# Patient Record
Sex: FEMALE | URBAN - METROPOLITAN AREA
[De-identification: names, ages, dates, MRNs, and addresses within clinical notes are randomized per-mention and may not be internally consistent; named-entity substitution may affect disease eponyms.]

---

## 2018-01-18 ENCOUNTER — THERAPY VISIT (OUTPATIENT)
Dept: PHYSICAL THERAPY | Facility: CLINIC | Age: 28
End: 2018-01-18
Payer: COMMERCIAL

## 2018-01-18 DIAGNOSIS — M54.9 BACK PAIN IN PREGNANCY: ICD-10-CM

## 2018-01-18 DIAGNOSIS — M54.41 ACUTE RIGHT-SIDED LOW BACK PAIN WITH RIGHT-SIDED SCIATICA: Primary | ICD-10-CM

## 2018-01-18 DIAGNOSIS — O99.891 BACK PAIN IN PREGNANCY: ICD-10-CM

## 2018-01-18 PROCEDURE — 97112 NEUROMUSCULAR REEDUCATION: CPT | Mod: GP | Performed by: PHYSICAL THERAPIST

## 2018-01-18 PROCEDURE — 97110 THERAPEUTIC EXERCISES: CPT | Mod: GP | Performed by: PHYSICAL THERAPIST

## 2018-01-18 PROCEDURE — 97161 PT EVAL LOW COMPLEX 20 MIN: CPT | Mod: GP | Performed by: PHYSICAL THERAPIST

## 2018-01-18 NOTE — LETTER
[facility] ATHLETIC [facility] PHYSICAL THERAPY  [address]  [facility] [address]  [phone]    2018    Re: Ryan George   :   1990  MRN:  [id number]   REFERRING PHYSICIAN:   Frida Goode    [facility] ATHLETIC [facility] PHYSICAL [facility]    Date of Initial Evaluation:  2018  Visits:  Rxs Used: 1  Reason for Referral:     Acute right-sided low back pain with right-sided sciatica  Back pain in pregnancy    EVALUATION SUMMARY    Subjective:  Patient is a 27 year old female presenting with rehab back hpi. The history is provided by the patient.   Ryan George is a 27 year old female with a lumbar condition.  Condition occurred with:  Other reason (patient 26 weeks pregnant. possible onset due to pregnancy 1 month ago).  Condition occurred: other.  This is a new condition  Saw MD on 17.   Patient reports pain:  Lumbar spine right (into buttock).  Radiates to:  Gluteals right.  Pain is described as aching (hurts to walk. dull pain that increases in intensity at times) and is constant and reported as 2/10 (at worst: 9/10. at best: 1/10 (better on weekends)).  Associated symptoms:  Pregnancy and loss of motion/stiffness (increased stiffness from moving less due to pain with motion/activity). Pain is worse in the P.M. (least painful when at work when sitting. aggravated with movement).  Symptoms are exacerbated by bending, walking, lying down, standing, lifting and carrying (worse lying on R side, walking 1.5 hours when does all shopping up to 9/10 pain, ascending stairs especially carrying groceries) and relieved by rest and ice.  Since onset symptoms are gradually improving.  General health as reported by patient is good.  Pertinent medical history includes:  Depression, overweight and currently pregnant.  Medical allergies: yes.  Other surgeries include:  No.  Medication history: prenatals and vitamins.  Current  occupation is .  Patient is working in normal job without restrictions.  Primary job tasks include:  Prolonged sitting, repetitive tasks and other (computer work).  Barriers include:  Other (3rd floor with no elevator).                  Objective:      Lumbar/SI Evaluation  ROM:    AROM Lumbar:   Flexion:          No loss  Ext:                    Min loss increased pain   Side Bend:        Left:     Right:   Rotation:           Left:     Right:   Re: Ryan George   :   1990    Side Glide:        Left:  No loss    Right:  No loss      Lumbar Myotomes:    T12-L3 (Hip Flex):  Left: 5    Right: 4+  L2-4 (Quads):  Left:  5    Right:  5  L4 (Ankle DF):  Left:  5    Right:  5  L5 (Great Toe Ext): Left: 5    Right: 5   Neural Tension/Mobility:  Neural tension wnl lumbar: L SLR creates R sided pain. SLR with DF on L creates R pain (not as intense as SLR w/o DF)    Left side:  SLR and SLR w/DF positive.  Left side:Slump  negative.   Right side:   SLR w/DF; Slump or SLR  negative.   Lumbar Palpation:    Tenderness not present at Left:    ASIS; Iliac Crest or Greater Trochanter  Tenderness present at Right: Piriformis and PSIS  Tenderness not present at Right:  ASIS; Iliac Crest or Greater Trochanter  SI joint/Sacrum:    (-) standing eduar Barnes Lumbar Evaluation  Posture:  Sitting: fair  Standing: fair  Lordosis: WNL  Lateral Shift: no  Correction of Posture: no effect  Test Movements:  EIS: During: increases  After: worse    Repeat EIS: During: increases  After: worse    Principle of Treatment:  Posture Correction: use of lumbar roll, slouch/overcorrect   Other: gluteal stretching, core stabilization, mid range motion, all fours cow position abolishes pain and remains abolished remaining session    Assessment/Plan:    Patient is a 27 year old female with lumbar and sacral complaints.    Patient has the following significant findings with corresponding treatment plan.                 Diagnosis 1:  R sciatic pain in pregnancy    Pain -  hot/cold therapy, manual therapy, self management, education and home program  Decreased ROM/flexibility - manual therapy, therapeutic exercise and home program  Decreased strength - therapeutic exercise, therapeutic activities and home program  Decreased function - therapeutic activities and home program  Impaired posture - neuro re-education and home program    Therapy Evaluation Codes:   1) History comprised of:   Personal factors that impact the plan of care:      .    Comorbidity factors that impact the plan of care are:      Current pregnancy, Overweight and Weakness.     Medications impacting care: Pain.  Re: Mckinleymiguelinavaleriy George   :   1990    2) Examination of Body Systems comprised of:   Body structures and functions that impact the plan of care:      Lumbar spine and Sacral illiac joint.   Activity limitations that impact the plan of care are:      Bending, Dressing, Lifting, Squatting/kneeling, Stairs, Standing and Walking.  3) Clinical presentation characteristics are:   Evolving/Changing.  4) Decision-Making    Low complexity using standardized patient assessment instrument and/or measureable assessment of functional outcome.  Cumulative Therapy Evaluation is: Low complexity.    Previous and current functional limitations:  (See Goal Flow Sheet for this information)    Short term and Long term goals: (See Goal Flow Sheet for this information)     Communication ability:  Patient appears to be able to clearly communicate and understand verbal and written communication and follow directions correctly.  Treatment Explanation - The following has been discussed with the patient:   RX ordered/plan of care  Anticipated outcomes  Possible risks and side effects  This patient would benefit from PT intervention to resume normal activities.   Rehab potential is good.    Frequency:  1 X week, once daily  Duration:  for 6 weeks  Discharge Plan:  Achieve all  LTG.  Independent in home treatment program.  Reach maximal therapeutic benefit.    Thank you for your referral.    INQUIRIES  Therapist: Jessica Gates DPT  [facility] [place] PHYSICAL THERAPY  [address] [address]  Phone: [phone]  Fax: [phone]

## 2018-01-18 NOTE — PROGRESS NOTES
[facility] Initial Evaluation  Subjective:  Patient is a 27 year old female presenting with rehab back hpi. The history is provided by the patient.   Ryan George is a 27 year old female with a lumbar condition.  Condition occurred with:  Other reason (patient 26 weeks pregnant. possible onset due to pregnancy 1 month ago).  Condition occurred: other.  This is a new condition  Saw MD on 12/28/17. .    Patient reports pain:  Lumbar spine right (into buttock).  Radiates to:  Gluteals right.  Pain is described as aching (hurts to walk. dull pain that increases in intensity at times) and is constant and reported as 2/10 (at worst: 9/10. at best: 1/10 (better on weekends)).  Associated symptoms:  Pregnancy and loss of motion/stiffness (increased stiffness from moving less due to pain with motion/activity). Pain is worse in the P.M. (least painful when at work when sitting. aggravated with movement).  Symptoms are exacerbated by bending, walking, lying down, standing, lifting and carrying (worse lying on R side, walking 1.5 hours when does all shopping up to 9/10 pain, ascending stairs especially carrying groceries) and relieved by rest and ice.  Since onset symptoms are gradually improving.        General health as reported by patient is good.  Pertinent medical history includes:  Depression, overweight and currently pregnant.  Medical allergies: yes.  Other surgeries include:  No.  Medication history: prenatals and vitamins.  Current occupation is .  Patient is working in normal job without restrictions.  Primary job tasks include:  Prolonged sitting, repetitive tasks and other (computer work).    Barriers include:  Other (3rd floor with no elevator. ).                            Objective:  System         Lumbar/SI Evaluation  ROM:    AROM Lumbar:   Flexion:          No loss  Ext:                    Min loss increased pain   Side Bend:        Left:     Right:   Rotation:            Left:     Right:   Side Glide:        Left:  No loss    Right:  No loss          Lumbar Myotomes:    T12-L3 (Hip Flex):  Left: 5    Right: 4+  L2-4 (Quads):  Left:  5    Right:  5  L4 (Ankle DF):  Left:  5    Right:  5  L5 (Great Toe Ext): Left: 5    Right: 5           Neural Tension/Mobility:  Neural tension wnl lumbar: L SLR creates R sided pain. SLR with DF on L creates R pain (not as intense as SLR w/o DF)    Left side:  SLR and SLR w/DF positive.  Left side:Slump  negative.     Right side:   SLR w/DF; Slump or SLR  negative.   Lumbar Palpation:      Tenderness not present at Left:    ASIS; Iliac Crest or Greater Trochanter  Tenderness present at Right: Piriformis and PSIS  Tenderness not present at Right:  ASIS; Iliac Crest or Greater Trochanter        SI joint/Sacrum:    (-) standing eduar Barnes Lumbar Evaluation    Posture:  Sitting: fair  Standing: fair  Lordosis: WNL  Lateral Shift: no  Correction of Posture: no effect      Test Movements:    EIS: During: increases  After: worse    Repeat EIS: During: increases  After: worse                Principle of Treatment:  Posture Correction: use of lumbar roll, slouch/overcorrect         Other: gluteal stretching, core stabilization, mid range motion, all fours cow position abolishes pain and remains abolished remaining session                                       ROS    Assessment/Plan:    Patient is a 27 year old female with lumbar and sacral complaints.    Patient has the following significant findings with corresponding treatment plan.                Diagnosis 1:  R sciatic pain in pregnancy    Pain -  hot/cold therapy, manual therapy, self management, education and home program  Decreased ROM/flexibility - manual therapy, therapeutic exercise and home program  Decreased strength - therapeutic exercise, therapeutic activities and home program  Decreased function - therapeutic activities and  home program  Impaired posture - neuro re-education and home program    Therapy Evaluation Codes:   1) History comprised of:   Personal factors that impact the plan of care:      .    Comorbidity factors that impact the plan of care are:      Current pregnancy, Overweight and Weakness.     Medications impacting care: Pain.  2) Examination of Body Systems comprised of:   Body structures and functions that impact the plan of care:      Lumbar spine and Sacral illiac joint.   Activity limitations that impact the plan of care are:      Bending, Dressing, Lifting, Squatting/kneeling, Stairs, Standing and Walking.  3) Clinical presentation characteristics are:   Evolving/Changing.  4) Decision-Making    Low complexity using standardized patient assessment instrument and/or measureable assessment of functional outcome.  Cumulative Therapy Evaluation is: Low complexity.    Previous and current functional limitations:  (See Goal Flow Sheet for this information)    Short term and Long term goals: (See Goal Flow Sheet for this information)     Communication ability:  Patient appears to be able to clearly communicate and understand verbal and written communication and follow directions correctly.  Treatment Explanation - The following has been discussed with the patient:   RX ordered/plan of care  Anticipated outcomes  Possible risks and side effects  This patient would benefit from PT intervention to resume normal activities.   Rehab potential is good.    Frequency:  1 X week, once daily  Duration:  for 6 weeks  Discharge Plan:  Achieve all LTG.  Independent in home treatment program.  Reach maximal therapeutic benefit.    Please refer to the daily flowsheet for treatment today, total treatment time and time spent performing 1:1 timed codes.

## 2018-01-25 ENCOUNTER — THERAPY VISIT (OUTPATIENT)
Dept: PHYSICAL THERAPY | Facility: CLINIC | Age: 28
End: 2018-01-25
Payer: COMMERCIAL

## 2018-01-25 DIAGNOSIS — M54.41 ACUTE RIGHT-SIDED LOW BACK PAIN WITH RIGHT-SIDED SCIATICA: ICD-10-CM

## 2018-01-25 DIAGNOSIS — M54.9 BACK PAIN IN PREGNANCY: ICD-10-CM

## 2018-01-25 DIAGNOSIS — O99.891 BACK PAIN IN PREGNANCY: ICD-10-CM

## 2018-01-25 PROCEDURE — 97110 THERAPEUTIC EXERCISES: CPT | Mod: GP | Performed by: PHYSICAL THERAPIST

## 2018-01-25 PROCEDURE — 97112 NEUROMUSCULAR REEDUCATION: CPT | Mod: GP | Performed by: PHYSICAL THERAPIST

## 2018-02-01 ENCOUNTER — THERAPY VISIT (OUTPATIENT)
Dept: PHYSICAL THERAPY | Facility: CLINIC | Age: 28
End: 2018-02-01
Payer: COMMERCIAL

## 2018-02-01 DIAGNOSIS — O99.891 BACK PAIN IN PREGNANCY: ICD-10-CM

## 2018-02-01 DIAGNOSIS — M54.9 BACK PAIN IN PREGNANCY: ICD-10-CM

## 2018-02-01 DIAGNOSIS — M54.41 ACUTE RIGHT-SIDED LOW BACK PAIN WITH RIGHT-SIDED SCIATICA: ICD-10-CM

## 2018-02-01 PROCEDURE — 97112 NEUROMUSCULAR REEDUCATION: CPT | Mod: GP | Performed by: PHYSICAL THERAPIST

## 2018-02-01 PROCEDURE — 97110 THERAPEUTIC EXERCISES: CPT | Mod: GP | Performed by: PHYSICAL THERAPIST

## 2018-02-01 NOTE — PROGRESS NOTES
[facility] Initial Evaluation  Subjective:  HPI  Oswestry Score: 0 %                 Objective:  System    Physical Exam    General     ROS    Assessment/Plan:    PROGRESS  REPORT    Progress reporting period is from 1/18/18 to 2/1/18, 3 visits.       SUBJECTIVE  Subjective changes noted by patient:  Back has been feeling pretty good.  Pain is only 1-2/10 at worst and exercises help to abolish pain.  Has been able to go to a rally and walk/sit/move for 5 hour time frame without flare up.  Sitting for dressing in the morning really helps.    Current Pain level: 1/10 (not really having pain).     Initial Pain level: 9/10.   Changes in function:  Yes (See Goal flowsheet attached for changes in current functional level)  Adverse reaction to treatment or activity: None    OBJECTIVE  Changes noted in objective findings:  Yes:  LROM flexion min loss, ext no loss, B SG no loss, all LROM painfree.    Lumbar myotomes all 5/5 WNL (R hip flexion improved from 4+/5 to 5/5.    Now (-) B SLR and (-) SLR with DF.    Patient responding well to light core stabilization and gluteal strengthening.     ASSESSMENT/PLAN  Updated problem list and treatment plan: Diagnosis 1:  R sciatic nerve pain    Pain -  home program  Decreased ROM/flexibility - home program  Decreased strength - home program  Decreased function - home program  STG/LTGs have been met or progress has been made towards goals:  Yes (See Goal flow sheet completed today.)  Assessment of Progress: The patient's condition is improving.  Self Management Plans:  Patient is independent in a home treatment program.  Patient is independent in self management of symptoms.  I have re-evaluated this patient and find that the nature, scope, duration and intensity of the therapy is appropriate for the medical condition of the patient.  Ryan continues to require the following intervention to meet STG and LTG's:  PT intervention is no longer required to meet  STG/LTG.    Recommendations:     This patient is ready to be discharged from therapy and continue their home treatment program.  If has changes/flare up in coming weeks due to pregnancy, may utilize more visits, but doing well as of now.    Please refer to the daily flowsheet for treatment today, total treatment time and time spent performing 1:1 timed codes.

## 2018-02-01 NOTE — MR AVS SNAPSHOT
"              After Visit Summary   2018    Ryan George    MRN: [id number]           Patient Information     Date Of Birth          1990        Visit Information        Provider Department      2018 8:10 AM Jessica Gates PT [facility] Physical Therapy        Today's Diagnoses     Acute right-sided low back pain with right-sided sciatica        Back pain in pregnancy           Follow-ups after your visit        Who to contact     If you have questions or need follow up information about today's clinic visit or your schedule please contact [facility] ATHLETIC [facility] PHYSICAL [facility] directly at [phone].  Normal or non-critical lab and imaging results will be communicated to you by Dragonflyhart, letter or phone within 4 business days after the clinic has received the results. If you do not hear from us within 7 days, please contact the clinic through Dragonflyhart or phone. If you have a critical or abnormal lab result, we will notify you by phone as soon as possible.  Submit refill requests through [employer] or call your pharmacy and they will forward the refill request to us. Please allow 3 business days for your refill to be completed.          Additional Information About Your Visit        MyChart Information     [employer] lets you send messages to your doctor, view your test results, renew your prescriptions, schedule appointments and more. To sign up, go to [email or web address] . Click on \"Log in\" on the left side of the screen, which will take you to the Welcome page. Then click on \"Sign up Now\" on the right side of the page.     You will be asked to enter the access code listed below, as well as some personal information. Please follow the directions to create your username and password.     Your access code is: [email or web address]  Expires: 2018  5:02 PM     Your access code will  in 90 days. If you need help or a new code, please call " your [facility] clinic or [phone].        Care EveryWhere ID     This is your Care EveryWhere ID. This could be used by other organizations to access your [facility] medical records  [id number]         Blood Pressure from Last 3 Encounters:   No data found for BP    Weight from Last 3 Encounters:   No data found for Wt              We Performed the Following     TEN PROGRESS NOTES REPORT     NEUROMUSCULAR RE-EDUCATION     THERAPEUTIC EXERCISES        Primary Care Provider Office Phone # Fax #    [facility] [phone] [address]  [place] [facility] [address]        Equal Access to Services     [facility] : Hadii aad ku hadasho Soomaali, waaxda luqadaha, qaybta kaalmada adeegyada, waxay niurkain hayphann basil ricks . So [facility] [phone].    ATENCIÓN: Si habla español, tiene a navarro disposición servicios gratuitos de asistencia lingüística. [facility] [phone].    We comply with applicable federal civil rights laws and [place] laws. We do not discriminate on the basis of race, color, national origin, age, disability, sex, sexual orientation, or gender identity.            Thank you!     Thank you for choosing [facility] PHYSICAL THERAPY  for your care. Our goal is always to provide you with excellent care. Hearing back from our patients is one way we can continue to improve our services. Please take a few minutes to complete the written survey that you may receive in the mail after your visit with us. Thank you!             Your Updated Medication List - Protect others around you: Learn how to safely use, store and throw away your medicines at www.disposemymeds.org.      Notice  As of 2/1/2018  9:08 AM    You have not been prescribed any medications.

## 2018-02-01 NOTE — LETTER
[facility]  [address]  [place] [address]  [phone]    2018    Re: Ryan George   :   1990  MRN:  [id number]   REFERRING PHYSICIAN:   Frida Goode    [facility]    Date of Initial Evaluation:  2018  Visits:  Rxs Used: 3  Reason for Referral:     Acute right-sided low back pain with right-sided sciatica  Back pain in pregnancy    PROGRESS  REPORT  Progress reporting period is from 18 to 18, 3 visits.       SUBJECTIVE  HPI  Oswestry Score: 0 %    Subjective changes noted by patient:  Back has been feeling pretty good.  Pain is only 1-2/10 at worst and exercises help to abolish pain.  Has been able to go to a rally and walk/sit/move for 5 hour time frame without flare up.  Sitting for dressing in the morning really helps.    Current Pain level: 1/10 (not really having pain).     Initial Pain level: 9/10.   Changes in function:  Yes (See Goal flowsheet attached for changes in current functional level)  Adverse reaction to treatment or activity: None    OBJECTIVE  Changes noted in objective findings:  Yes:  LROM flexion min loss, ext no loss, B SG no loss, all LROM painfree.    Lumbar myotomes all 5/5 WNL (R hip flexion improved from 4+/5 to 5/5.    Now (-) B SLR and (-) SLR with DF.    Patient responding well to light core stabilization and gluteal strengthening.     ASSESSMENT/PLAN  Updated problem list and treatment plan: Diagnosis 1:  R sciatic nerve pain  Pain -  home program  Decreased ROM/flexibility - home program  Decreased strength - home program  Decreased function - home program  STG/LTGs have been met or progress has been made towards goals:  Yes (See Goal flow sheet completed today.)  Assessment of Progress: The patient's condition is improving.  Self Management Plans:  Patient is independent in a home treatment program.  Re:  Ryan Ariel   :   1990    Patient is independent in self management of symptoms.  I have re-evaluated this patient and find that the nature, scope, duration and intensity of the therapy is appropriate for the medical condition of the patient.  Ryan continues to require the following intervention to meet STG and LTG's:  PT intervention is no longer required to meet STG/LTG.    Recommendations:     This patient is ready to be discharged from therapy and continue their home treatment program.  If has changes/flare up in coming weeks due to pregnancy, may utilize more visits, but doing well as of now.    Thank you for your referral.    INQUIRIES  Therapist: Jessica Gates DPT  [facility] [place] PHYSICAL THERAPY  [address] [address]  Phone: [phone]  Fax: [phone]

## 2021-03-27 NOTE — MR AVS SNAPSHOT
After Visit Summary   1/18/2018    Ryan George    MRN: [id number]           Patient Information     Date Of Birth          1990        Visit Information        Provider Department      1/18/2018 8:50 AM Jessica Gates, PT [facility] Physical [facility]        Today's Diagnoses     Acute right-sided low back pain with right-sided sciatica    -  1    Back pain in pregnancy           Follow-ups after your visit        Your next 10 appointments already scheduled     Jan 25, 2018 12:10 PM CST   ETN Spine with Jessica Gates PT   [facility] Physical Therapy ([facility])    [address] [place] [address] [address]  [facility] [address]   [phone]            Feb 01, 2018  8:10 AM CST   TEN Spine with Jessica Gates PT   [facility] Physical [facility] ([facility])    [address] [place] [address] [address]  [facility] [address]   [phone]              Who to contact     If you have questions or need follow up information about today's clinic visit or your schedule please contact [facility] PH[facility] directly at [phone].  Normal or non-critical lab and imaging results will be communicated to you by MyChart, letter or phone within 4 business days after the clinic has received the results. If you do not hear from us within 7 days, please contact the clinic through MyChart or phone. If you have a critical or abnormal lab result, we will notify you by phone as soon as possible.  Submit refill requests through [employer] or call your pharmacy and they will forward the refill request to us. Please allow 3 business days for your refill to be completed.          Additional Information About Your Visit        [email or web address] Information     [employer] lets you send messages to your doctor, view your test results, renew your prescriptions, schedule  "appointments and more. To sign up, go to [email or web address] . Click on \"Log in\" on the left side of the screen, which will take you to the Welcome page. Then click on \"Sign up Now\" on the right side of the page.     You will be asked to enter the access code listed below, as well as some personal information. Please follow the directions to create your username and password.     Your access code is: [email or web address]  Expires: 2018  5:02 PM     Your access code will  in 90 days. If you need help or a new code, please call your [facility] clinic or [phone].        Care EveryWhere ID     This is your Care EveryWhere ID. This could be used by other organizations to access your [facility] medical records  [id number]         Blood Pressure from Last 3 Encounters:   No data found for BP    Weight from Last 3 Encounters:   No data found for Wt              We Performed the Following     HC PT EVAL, LOW COMPLEXITY     TEN INITIAL EVAL REPORT     NEUROMUSCULAR RE-EDUCATION     THERAPEUTIC EXERCISES        Primary Care Provider Office Phone # Fax #    [facility] [phone] [address] [facility] [address] [place], [place] [id number]        Equal Access to Services     LUCHO MUÑOZ : Hadii maximino browno Sojacob, waaxda luqadaha, qaybta kaalmada adeegyada, saran greenwood. So [facility] [phone].    ATENCIÓN: Si habla español, tiene a navarro disposición servicios gratuitos de asistencia lingüística. Llame al [phone].    We comply with applicable federal civil rights laws and [place] laws. We do not discriminate on the basis of race, color, national origin, age, disability, sex, sexual orientation, or gender identity.            Thank you!     Thank you for choosing [facility] PHYSICAL THERAP[facility]  for your care. Our goal is always to provide you with excellent care. Hearing back from our patients is one way we can continue to " improve our services. Please take a few minutes to complete the written survey that you may receive in the mail after your visit with us. Thank you!             Your Updated Medication List - Protect others around you: Learn how to safely use, store and throw away your medicines at www.disposemymeds.org.      Notice  As of 1/18/2018  5:02 PM    You have not been prescribed any medications.       Pyelonephritis Fever